# Patient Record
Sex: MALE | Race: OTHER
[De-identification: names, ages, dates, MRNs, and addresses within clinical notes are randomized per-mention and may not be internally consistent; named-entity substitution may affect disease eponyms.]

---

## 2021-08-02 ENCOUNTER — HOSPITAL ENCOUNTER (INPATIENT)
Dept: HOSPITAL 74 - YASAS | Age: 46
LOS: 28 days | Discharge: HOME | DRG: 772 | End: 2021-08-30
Attending: ALLERGY & IMMUNOLOGY | Admitting: ALLERGY & IMMUNOLOGY
Payer: COMMERCIAL

## 2021-08-02 VITALS — BODY MASS INDEX: 29.2 KG/M2

## 2021-08-02 DIAGNOSIS — Z56.0: ICD-10-CM

## 2021-08-02 DIAGNOSIS — F14.20: ICD-10-CM

## 2021-08-02 DIAGNOSIS — Z86.59: ICD-10-CM

## 2021-08-02 DIAGNOSIS — F12.20: ICD-10-CM

## 2021-08-02 DIAGNOSIS — L84: ICD-10-CM

## 2021-08-02 DIAGNOSIS — Z90.49: ICD-10-CM

## 2021-08-02 DIAGNOSIS — F11.20: Primary | ICD-10-CM

## 2021-08-02 DIAGNOSIS — Z59.0: ICD-10-CM

## 2021-08-02 PROCEDURE — HZ42ZZZ GROUP COUNSELING FOR SUBSTANCE ABUSE TREATMENT, COGNITIVE-BEHAVIORAL: ICD-10-PCS | Performed by: ALLERGY & IMMUNOLOGY

## 2021-08-02 PROCEDURE — U0003 INFECTIOUS AGENT DETECTION BY NUCLEIC ACID (DNA OR RNA); SEVERE ACUTE RESPIRATORY SYNDROME CORONAVIRUS 2 (SARS-COV-2) (CORONAVIRUS DISEASE [COVID-19]), AMPLIFIED PROBE TECHNIQUE, MAKING USE OF HIGH THROUGHPUT TECHNOLOGIES AS DESCRIBED BY CMS-2020-01-R: HCPCS

## 2021-08-02 PROCEDURE — C9803 HOPD COVID-19 SPEC COLLECT: HCPCS

## 2021-08-02 PROCEDURE — U0005 INFEC AGEN DETEC AMPLI PROBE: HCPCS

## 2021-08-02 RX ADMIN — Medication SCH MG: at 22:50

## 2021-08-02 RX ADMIN — TRAZODONE HYDROCHLORIDE SCH MG: 50 TABLET ORAL at 22:50

## 2021-08-02 RX ADMIN — HYDROXYZINE PAMOATE PRN MG: 25 CAPSULE ORAL at 22:50

## 2021-08-02 SDOH — ECONOMIC STABILITY - INCOME SECURITY: UNEMPLOYMENT, UNSPECIFIED: Z56.0

## 2021-08-02 SDOH — ECONOMIC STABILITY - HOUSING INSECURITY: HOMELESSNESS: Z59.0

## 2021-08-03 LAB
ALBUMIN SERPL-MCNC: 3.2 G/DL (ref 3.4–5)
ALP SERPL-CCNC: 62 U/L (ref 45–117)
ALT SERPL-CCNC: 31 U/L (ref 13–61)
ANION GAP SERPL CALC-SCNC: 7 MMOL/L (ref 8–16)
AST SERPL-CCNC: 23 U/L (ref 15–37)
BILIRUB SERPL-MCNC: 0.5 MG/DL (ref 0.2–1)
BUN SERPL-MCNC: 9.9 MG/DL (ref 7–18)
CALCIUM SERPL-MCNC: 8.6 MG/DL (ref 8.5–10.1)
CHLORIDE SERPL-SCNC: 105 MMOL/L (ref 98–107)
CO2 SERPL-SCNC: 26 MMOL/L (ref 21–32)
CREAT SERPL-MCNC: 0.9 MG/DL (ref 0.55–1.3)
DEPRECATED RDW RBC AUTO: 13.5 % (ref 11.9–15.9)
GLUCOSE SERPL-MCNC: 213 MG/DL (ref 74–106)
HCT VFR BLD CALC: 35.8 % (ref 35.4–49)
HGB BLD-MCNC: 12 GM/DL (ref 11.7–16.9)
MCH RBC QN AUTO: 29.7 PG (ref 25.7–33.7)
MCHC RBC AUTO-ENTMCNC: 33.6 G/DL (ref 32–35.9)
MCV RBC: 88.3 FL (ref 80–96)
PLATELET # BLD AUTO: 256 10^3/UL (ref 134–434)
PMV BLD: 8.8 FL (ref 7.5–11.1)
PROT SERPL-MCNC: 7.1 G/DL (ref 6.4–8.2)
RBC # BLD AUTO: 4.05 M/MM3 (ref 4–5.6)
SODIUM SERPL-SCNC: 138 MMOL/L (ref 136–145)
WBC # BLD AUTO: 6 K/MM3 (ref 4–10)

## 2021-08-03 RX ADMIN — METFORMIN HYDROCHLORIDE SCH MG: 500 TABLET ORAL at 10:05

## 2021-08-03 RX ADMIN — TRAZODONE HYDROCHLORIDE SCH MG: 50 TABLET ORAL at 21:47

## 2021-08-03 RX ADMIN — Medication SCH MG: at 21:47

## 2021-08-03 RX ADMIN — METFORMIN HYDROCHLORIDE SCH MG: 500 TABLET ORAL at 17:11

## 2021-08-03 RX ADMIN — Medication SCH TAB: at 10:05

## 2021-08-04 RX ADMIN — Medication SCH MG: at 21:28

## 2021-08-04 RX ADMIN — TRAZODONE HYDROCHLORIDE SCH MG: 100 TABLET ORAL at 21:28

## 2021-08-04 RX ADMIN — Medication SCH TAB: at 10:11

## 2021-08-04 RX ADMIN — METFORMIN HYDROCHLORIDE SCH MG: 500 TABLET ORAL at 06:57

## 2021-08-04 RX ADMIN — METFORMIN HYDROCHLORIDE SCH MG: 500 TABLET ORAL at 17:15

## 2021-08-05 LAB
APPEARANCE UR: CLEAR
BILIRUB UR STRIP.AUTO-MCNC: NEGATIVE MG/DL
COLOR UR: YELLOW
KETONES UR QL STRIP: NEGATIVE
LEUKOCYTE ESTERASE UR QL STRIP.AUTO: NEGATIVE
NITRITE UR QL STRIP: NEGATIVE
PH UR: 5 [PH] (ref 5–8)
PROT UR QL STRIP: NEGATIVE
PROT UR QL STRIP: NEGATIVE
SP GR UR: 1.02 (ref 1.01–1.03)
UROBILINOGEN UR STRIP-MCNC: 0.2 MG/DL (ref 0.2–1)

## 2021-08-05 RX ADMIN — TRAZODONE HYDROCHLORIDE SCH MG: 100 TABLET ORAL at 21:45

## 2021-08-05 RX ADMIN — METFORMIN HYDROCHLORIDE SCH MG: 500 TABLET ORAL at 16:38

## 2021-08-05 RX ADMIN — Medication SCH MG: at 21:45

## 2021-08-05 RX ADMIN — Medication SCH TAB: at 09:47

## 2021-08-05 RX ADMIN — METFORMIN HYDROCHLORIDE SCH MG: 500 TABLET ORAL at 06:29

## 2021-08-05 RX ADMIN — ALUMINUM HYDROXIDE, MAGNESIUM HYDROXIDE, AND SIMETHICONE PRN ML: 200; 200; 20 SUSPENSION ORAL at 09:09

## 2021-08-06 RX ADMIN — Medication SCH MG: at 21:29

## 2021-08-06 RX ADMIN — Medication SCH TAB: at 09:52

## 2021-08-06 RX ADMIN — METFORMIN HYDROCHLORIDE SCH MG: 500 TABLET ORAL at 06:26

## 2021-08-06 RX ADMIN — TRAZODONE HYDROCHLORIDE SCH MG: 100 TABLET ORAL at 21:28

## 2021-08-06 RX ADMIN — METFORMIN HYDROCHLORIDE SCH MG: 500 TABLET ORAL at 16:45

## 2021-08-07 RX ADMIN — METFORMIN HYDROCHLORIDE SCH MG: 500 TABLET ORAL at 17:11

## 2021-08-07 RX ADMIN — Medication SCH MG: at 22:33

## 2021-08-07 RX ADMIN — Medication SCH TAB: at 09:46

## 2021-08-07 RX ADMIN — Medication SCH MG: at 22:32

## 2021-08-07 RX ADMIN — METFORMIN HYDROCHLORIDE SCH MG: 500 TABLET ORAL at 06:48

## 2021-08-07 RX ADMIN — TRAZODONE HYDROCHLORIDE SCH MG: 100 TABLET ORAL at 22:32

## 2021-08-08 RX ADMIN — TRAZODONE HYDROCHLORIDE SCH MG: 100 TABLET ORAL at 21:56

## 2021-08-08 RX ADMIN — METFORMIN HYDROCHLORIDE SCH MG: 500 TABLET ORAL at 16:26

## 2021-08-08 RX ADMIN — Medication SCH MG: at 21:56

## 2021-08-08 RX ADMIN — METFORMIN HYDROCHLORIDE SCH MG: 500 TABLET ORAL at 06:22

## 2021-08-08 RX ADMIN — Medication SCH TAB: at 09:13

## 2021-08-09 RX ADMIN — Medication SCH: at 10:23

## 2021-08-09 RX ADMIN — Medication SCH MG: at 21:31

## 2021-08-09 RX ADMIN — TRAZODONE HYDROCHLORIDE SCH MG: 100 TABLET ORAL at 21:31

## 2021-08-09 RX ADMIN — ALUMINUM HYDROXIDE, MAGNESIUM HYDROXIDE, AND SIMETHICONE PRN ML: 200; 200; 20 SUSPENSION ORAL at 06:56

## 2021-08-09 RX ADMIN — METFORMIN HYDROCHLORIDE SCH MG: 500 TABLET ORAL at 06:57

## 2021-08-09 RX ADMIN — METFORMIN HYDROCHLORIDE SCH: 500 TABLET ORAL at 16:55

## 2021-08-10 RX ADMIN — Medication SCH MG: at 21:44

## 2021-08-10 RX ADMIN — METFORMIN HYDROCHLORIDE SCH MG: 500 TABLET ORAL at 17:36

## 2021-08-10 RX ADMIN — Medication SCH TAB: at 09:31

## 2021-08-10 RX ADMIN — TRAZODONE HYDROCHLORIDE SCH MG: 100 TABLET ORAL at 21:45

## 2021-08-10 RX ADMIN — METFORMIN HYDROCHLORIDE SCH MG: 500 TABLET ORAL at 06:25

## 2021-08-11 RX ADMIN — TRAZODONE HYDROCHLORIDE SCH MG: 100 TABLET ORAL at 21:31

## 2021-08-11 RX ADMIN — METFORMIN HYDROCHLORIDE SCH MG: 500 TABLET ORAL at 06:34

## 2021-08-11 RX ADMIN — Medication SCH TAB: at 09:48

## 2021-08-11 RX ADMIN — METFORMIN HYDROCHLORIDE SCH MG: 500 TABLET ORAL at 16:41

## 2021-08-11 RX ADMIN — Medication SCH MG: at 21:31

## 2021-08-12 RX ADMIN — METFORMIN HYDROCHLORIDE SCH MG: 500 TABLET ORAL at 06:28

## 2021-08-12 RX ADMIN — Medication SCH MG: at 21:59

## 2021-08-12 RX ADMIN — METFORMIN HYDROCHLORIDE SCH MG: 500 TABLET ORAL at 16:42

## 2021-08-12 RX ADMIN — Medication SCH TAB: at 09:59

## 2021-08-12 RX ADMIN — TRAZODONE HYDROCHLORIDE SCH MG: 100 TABLET ORAL at 21:59

## 2021-08-13 RX ADMIN — METFORMIN HYDROCHLORIDE SCH MG: 500 TABLET ORAL at 06:18

## 2021-08-13 RX ADMIN — TRAZODONE HYDROCHLORIDE SCH MG: 100 TABLET ORAL at 21:42

## 2021-08-13 RX ADMIN — Medication SCH MG: at 21:42

## 2021-08-13 RX ADMIN — Medication SCH TAB: at 10:04

## 2021-08-13 RX ADMIN — METFORMIN HYDROCHLORIDE SCH MG: 500 TABLET ORAL at 17:42

## 2021-08-14 RX ADMIN — TRAZODONE HYDROCHLORIDE SCH MG: 100 TABLET ORAL at 21:36

## 2021-08-14 RX ADMIN — Medication SCH MG: at 21:36

## 2021-08-14 RX ADMIN — METFORMIN HYDROCHLORIDE SCH MG: 500 TABLET ORAL at 06:52

## 2021-08-14 RX ADMIN — Medication SCH TAB: at 10:02

## 2021-08-14 RX ADMIN — METFORMIN HYDROCHLORIDE SCH MG: 500 TABLET ORAL at 17:43

## 2021-08-15 RX ADMIN — TRAZODONE HYDROCHLORIDE SCH MG: 100 TABLET ORAL at 21:19

## 2021-08-15 RX ADMIN — Medication SCH MG: at 21:19

## 2021-08-15 RX ADMIN — Medication SCH TAB: at 09:52

## 2021-08-15 RX ADMIN — METFORMIN HYDROCHLORIDE SCH: 500 TABLET ORAL at 16:33

## 2021-08-15 RX ADMIN — HYDROXYZINE PAMOATE PRN MG: 25 CAPSULE ORAL at 21:19

## 2021-08-15 RX ADMIN — METFORMIN HYDROCHLORIDE SCH MG: 500 TABLET ORAL at 06:35

## 2021-08-16 RX ADMIN — METFORMIN HYDROCHLORIDE SCH MG: 500 TABLET ORAL at 06:27

## 2021-08-16 RX ADMIN — Medication SCH TAB: at 09:53

## 2021-08-16 RX ADMIN — METFORMIN HYDROCHLORIDE SCH MG: 500 TABLET ORAL at 16:41

## 2021-08-16 RX ADMIN — Medication SCH MG: at 21:31

## 2021-08-16 RX ADMIN — TRAZODONE HYDROCHLORIDE SCH MG: 100 TABLET ORAL at 21:31

## 2021-08-17 RX ADMIN — Medication SCH MG: at 21:04

## 2021-08-17 RX ADMIN — METFORMIN HYDROCHLORIDE SCH MG: 500 TABLET ORAL at 16:47

## 2021-08-17 RX ADMIN — Medication SCH MG: at 21:03

## 2021-08-17 RX ADMIN — TRAZODONE HYDROCHLORIDE SCH MG: 100 TABLET ORAL at 21:04

## 2021-08-17 RX ADMIN — Medication SCH TAB: at 09:30

## 2021-08-17 RX ADMIN — METFORMIN HYDROCHLORIDE SCH MG: 500 TABLET ORAL at 06:16

## 2021-08-18 RX ADMIN — Medication SCH MG: at 21:40

## 2021-08-18 RX ADMIN — TRAZODONE HYDROCHLORIDE SCH MG: 100 TABLET ORAL at 21:40

## 2021-08-18 RX ADMIN — METFORMIN HYDROCHLORIDE SCH MG: 500 TABLET ORAL at 06:28

## 2021-08-18 RX ADMIN — Medication SCH TAB: at 09:47

## 2021-08-18 RX ADMIN — METFORMIN HYDROCHLORIDE SCH MG: 500 TABLET ORAL at 16:55

## 2021-08-19 RX ADMIN — Medication SCH MG: at 21:35

## 2021-08-19 RX ADMIN — Medication SCH TAB: at 09:42

## 2021-08-19 RX ADMIN — TRAZODONE HYDROCHLORIDE SCH MG: 100 TABLET ORAL at 21:35

## 2021-08-19 RX ADMIN — METFORMIN HYDROCHLORIDE SCH MG: 500 TABLET ORAL at 16:50

## 2021-08-19 RX ADMIN — METFORMIN HYDROCHLORIDE SCH MG: 500 TABLET ORAL at 07:16

## 2021-08-20 RX ADMIN — METFORMIN HYDROCHLORIDE SCH MG: 500 TABLET ORAL at 17:18

## 2021-08-20 RX ADMIN — Medication SCH MG: at 21:36

## 2021-08-20 RX ADMIN — Medication SCH TAB: at 09:56

## 2021-08-20 RX ADMIN — METFORMIN HYDROCHLORIDE SCH MG: 500 TABLET ORAL at 06:20

## 2021-08-20 RX ADMIN — TRAZODONE HYDROCHLORIDE SCH MG: 100 TABLET ORAL at 21:36

## 2021-08-21 RX ADMIN — TRAZODONE HYDROCHLORIDE SCH MG: 100 TABLET ORAL at 21:30

## 2021-08-21 RX ADMIN — METFORMIN HYDROCHLORIDE SCH MG: 500 TABLET ORAL at 06:36

## 2021-08-21 RX ADMIN — Medication SCH TAB: at 09:15

## 2021-08-21 RX ADMIN — Medication SCH MG: at 21:31

## 2021-08-21 RX ADMIN — METFORMIN HYDROCHLORIDE SCH MG: 500 TABLET ORAL at 17:07

## 2021-08-21 RX ADMIN — Medication SCH MG: at 21:30

## 2021-08-22 RX ADMIN — METFORMIN HYDROCHLORIDE SCH MG: 500 TABLET ORAL at 16:43

## 2021-08-22 RX ADMIN — TRAZODONE HYDROCHLORIDE SCH MG: 100 TABLET ORAL at 21:30

## 2021-08-22 RX ADMIN — Medication SCH MG: at 21:30

## 2021-08-22 RX ADMIN — METFORMIN HYDROCHLORIDE SCH MG: 500 TABLET ORAL at 06:25

## 2021-08-22 RX ADMIN — Medication SCH TAB: at 09:06

## 2021-08-23 RX ADMIN — Medication SCH MG: at 21:44

## 2021-08-23 RX ADMIN — TRAZODONE HYDROCHLORIDE SCH MG: 100 TABLET ORAL at 21:44

## 2021-08-23 RX ADMIN — METFORMIN HYDROCHLORIDE SCH MG: 500 TABLET ORAL at 06:23

## 2021-08-23 RX ADMIN — Medication SCH TAB: at 09:55

## 2021-08-23 RX ADMIN — METFORMIN HYDROCHLORIDE SCH MG: 500 TABLET ORAL at 17:15

## 2021-08-24 RX ADMIN — TRAZODONE HYDROCHLORIDE SCH MG: 100 TABLET ORAL at 21:43

## 2021-08-24 RX ADMIN — Medication SCH MG: at 21:43

## 2021-08-24 RX ADMIN — Medication SCH TAB: at 09:27

## 2021-08-24 RX ADMIN — METFORMIN HYDROCHLORIDE SCH MG: 500 TABLET ORAL at 18:53

## 2021-08-24 RX ADMIN — METFORMIN HYDROCHLORIDE SCH MG: 500 TABLET ORAL at 06:32

## 2021-08-25 RX ADMIN — TRAZODONE HYDROCHLORIDE SCH MG: 100 TABLET ORAL at 21:41

## 2021-08-25 RX ADMIN — Medication SCH TAB: at 09:19

## 2021-08-25 RX ADMIN — METFORMIN HYDROCHLORIDE SCH MG: 500 TABLET ORAL at 16:56

## 2021-08-25 RX ADMIN — Medication SCH MG: at 21:41

## 2021-08-25 RX ADMIN — METFORMIN HYDROCHLORIDE SCH MG: 500 TABLET ORAL at 06:39

## 2021-08-26 RX ADMIN — Medication SCH MG: at 21:30

## 2021-08-26 RX ADMIN — Medication SCH TAB: at 09:32

## 2021-08-26 RX ADMIN — TRAZODONE HYDROCHLORIDE SCH MG: 100 TABLET ORAL at 21:30

## 2021-08-26 RX ADMIN — METFORMIN HYDROCHLORIDE SCH MG: 500 TABLET ORAL at 16:48

## 2021-08-26 RX ADMIN — METFORMIN HYDROCHLORIDE SCH MG: 500 TABLET ORAL at 06:14

## 2021-08-27 RX ADMIN — Medication SCH MG: at 22:05

## 2021-08-27 RX ADMIN — TRAZODONE HYDROCHLORIDE SCH MG: 100 TABLET ORAL at 22:05

## 2021-08-27 RX ADMIN — METFORMIN HYDROCHLORIDE SCH MG: 500 TABLET ORAL at 06:40

## 2021-08-27 RX ADMIN — METFORMIN HYDROCHLORIDE SCH MG: 500 TABLET ORAL at 17:06

## 2021-08-27 RX ADMIN — Medication SCH TAB: at 10:02

## 2021-08-28 RX ADMIN — Medication SCH MG: at 21:12

## 2021-08-28 RX ADMIN — METFORMIN HYDROCHLORIDE SCH MG: 500 TABLET ORAL at 16:10

## 2021-08-28 RX ADMIN — METFORMIN HYDROCHLORIDE SCH MG: 500 TABLET ORAL at 06:42

## 2021-08-28 RX ADMIN — Medication SCH TAB: at 09:53

## 2021-08-28 RX ADMIN — TRAZODONE HYDROCHLORIDE SCH MG: 100 TABLET ORAL at 21:12

## 2021-08-29 RX ADMIN — Medication SCH MG: at 22:13

## 2021-08-29 RX ADMIN — METFORMIN HYDROCHLORIDE SCH MG: 500 TABLET ORAL at 06:27

## 2021-08-29 RX ADMIN — METFORMIN HYDROCHLORIDE SCH MG: 500 TABLET ORAL at 16:15

## 2021-08-29 RX ADMIN — Medication SCH TAB: at 09:31

## 2021-08-29 RX ADMIN — TRAZODONE HYDROCHLORIDE SCH MG: 100 TABLET ORAL at 22:13

## 2021-08-30 VITALS — HEART RATE: 101 BPM | TEMPERATURE: 97.6 F | SYSTOLIC BLOOD PRESSURE: 93 MMHG | DIASTOLIC BLOOD PRESSURE: 62 MMHG

## 2021-08-30 RX ADMIN — METFORMIN HYDROCHLORIDE SCH MG: 500 TABLET ORAL at 07:09

## 2021-08-30 RX ADMIN — Medication SCH TAB: at 09:33

## 2021-11-10 ENCOUNTER — EMERGENCY (EMERGENCY)
Facility: HOSPITAL | Age: 46
LOS: 1 days | Discharge: LEFT BEFORE DISCHARGE | End: 2021-11-10
Attending: EMERGENCY MEDICINE | Admitting: EMERGENCY MEDICINE
Payer: MEDICAID

## 2021-11-10 VITALS
WEIGHT: 160.06 LBS | SYSTOLIC BLOOD PRESSURE: 130 MMHG | OXYGEN SATURATION: 98 % | TEMPERATURE: 99 F | DIASTOLIC BLOOD PRESSURE: 92 MMHG | RESPIRATION RATE: 18 BRPM | HEART RATE: 81 BPM | HEIGHT: 65 IN

## 2021-11-10 DIAGNOSIS — F31.9 BIPOLAR DISORDER, UNSPECIFIED: ICD-10-CM

## 2021-11-10 DIAGNOSIS — Z20.822 CONTACT WITH AND (SUSPECTED) EXPOSURE TO COVID-19: ICD-10-CM

## 2021-11-10 DIAGNOSIS — F12.10 CANNABIS ABUSE, UNCOMPLICATED: ICD-10-CM
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
## 2021-11-10 DIAGNOSIS — R45.851 SUICIDAL IDEATIONS: ICD-10-CM

## 2021-11-10 DIAGNOSIS — F17.200 NICOTINE DEPENDENCE, UNSPECIFIED, UNCOMPLICATED: ICD-10-CM

## 2021-11-10 DIAGNOSIS — F14.10 COCAINE ABUSE, UNCOMPLICATED: ICD-10-CM

## 2021-11-10 DIAGNOSIS — F10.10 ALCOHOL ABUSE, UNCOMPLICATED: ICD-10-CM

## 2021-11-10 LAB
ALBUMIN SERPL ELPH-MCNC: 4.1 G/DL — SIGNIFICANT CHANGE UP (ref 3.4–5)
ALP SERPL-CCNC: 74 U/L — SIGNIFICANT CHANGE UP (ref 40–120)
ALT FLD-CCNC: 22 U/L — SIGNIFICANT CHANGE UP (ref 12–42)
AMPHET UR-MCNC: NEGATIVE — SIGNIFICANT CHANGE UP
ANION GAP SERPL CALC-SCNC: 12 MMOL/L — SIGNIFICANT CHANGE UP (ref 9–16)
APAP SERPL-MCNC: <2 UG/ML — LOW (ref 10–30)
APPEARANCE UR: CLEAR — SIGNIFICANT CHANGE UP
AST SERPL-CCNC: 26 U/L — SIGNIFICANT CHANGE UP (ref 15–37)
BACTERIA # UR AUTO: PRESENT /HPF
BARBITURATES UR SCN-MCNC: NEGATIVE — SIGNIFICANT CHANGE UP
BASOPHILS # BLD AUTO: 0.03 K/UL — SIGNIFICANT CHANGE UP (ref 0–0.2)
BASOPHILS NFR BLD AUTO: 0.3 % — SIGNIFICANT CHANGE UP (ref 0–2)
BENZODIAZ UR-MCNC: NEGATIVE — SIGNIFICANT CHANGE UP
BILIRUB SERPL-MCNC: 0.6 MG/DL — SIGNIFICANT CHANGE UP (ref 0.2–1.2)
BILIRUB UR-MCNC: ABNORMAL
BUN SERPL-MCNC: 16 MG/DL — SIGNIFICANT CHANGE UP (ref 7–23)
CALCIUM SERPL-MCNC: 9.6 MG/DL — SIGNIFICANT CHANGE UP (ref 8.5–10.5)
CHLORIDE SERPL-SCNC: 97 MMOL/L — SIGNIFICANT CHANGE UP (ref 96–108)
CO2 SERPL-SCNC: 23 MMOL/L — SIGNIFICANT CHANGE UP (ref 22–31)
COCAINE METAB.OTHER UR-MCNC: POSITIVE
COLOR SPEC: YELLOW — SIGNIFICANT CHANGE UP
CREAT SERPL-MCNC: 0.92 MG/DL — SIGNIFICANT CHANGE UP (ref 0.5–1.3)
DIFF PNL FLD: NEGATIVE — SIGNIFICANT CHANGE UP
EOSINOPHIL # BLD AUTO: 0.05 K/UL — SIGNIFICANT CHANGE UP (ref 0–0.5)
EOSINOPHIL NFR BLD AUTO: 0.5 % — SIGNIFICANT CHANGE UP (ref 0–6)
EPI CELLS # UR: SIGNIFICANT CHANGE UP /HPF (ref 0–5)
ETHANOL SERPL-MCNC: <3 MG/DL — SIGNIFICANT CHANGE UP
GLUCOSE SERPL-MCNC: 307 MG/DL — HIGH (ref 70–99)
GLUCOSE UR QL: 500
HCT VFR BLD CALC: 42.6 % — SIGNIFICANT CHANGE UP (ref 39–50)
HGB BLD-MCNC: 14.1 G/DL — SIGNIFICANT CHANGE UP (ref 13–17)
IMM GRANULOCYTES NFR BLD AUTO: 0.3 % — SIGNIFICANT CHANGE UP (ref 0–1.5)
KETONES UR-MCNC: 15 MG/DL
LEUKOCYTE ESTERASE UR-ACNC: NEGATIVE — SIGNIFICANT CHANGE UP
LYMPHOCYTES # BLD AUTO: 2.42 K/UL — SIGNIFICANT CHANGE UP (ref 1–3.3)
LYMPHOCYTES # BLD AUTO: 24.7 % — SIGNIFICANT CHANGE UP (ref 13–44)
MCHC RBC-ENTMCNC: 28.9 PG — SIGNIFICANT CHANGE UP (ref 27–34)
MCHC RBC-ENTMCNC: 33.1 GM/DL — SIGNIFICANT CHANGE UP (ref 32–36)
MCV RBC AUTO: 87.3 FL — SIGNIFICANT CHANGE UP (ref 80–100)
METHADONE UR-MCNC: NEGATIVE — SIGNIFICANT CHANGE UP
MONOCYTES # BLD AUTO: 0.82 K/UL — SIGNIFICANT CHANGE UP (ref 0–0.9)
MONOCYTES NFR BLD AUTO: 8.4 % — SIGNIFICANT CHANGE UP (ref 2–14)
NEUTROPHILS # BLD AUTO: 6.43 K/UL — SIGNIFICANT CHANGE UP (ref 1.8–7.4)
NEUTROPHILS NFR BLD AUTO: 65.8 % — SIGNIFICANT CHANGE UP (ref 43–77)
NITRITE UR-MCNC: NEGATIVE — SIGNIFICANT CHANGE UP
NRBC # BLD: 0 /100 WBCS — SIGNIFICANT CHANGE UP (ref 0–0)
OPIATES UR-MCNC: NEGATIVE — SIGNIFICANT CHANGE UP
PCP SPEC-MCNC: SIGNIFICANT CHANGE UP
PCP UR-MCNC: NEGATIVE — SIGNIFICANT CHANGE UP
PH UR: 6 — SIGNIFICANT CHANGE UP (ref 5–8)
PLATELET # BLD AUTO: 324 K/UL — SIGNIFICANT CHANGE UP (ref 150–400)
POTASSIUM SERPL-MCNC: 3.8 MMOL/L — SIGNIFICANT CHANGE UP (ref 3.5–5.3)
POTASSIUM SERPL-SCNC: 3.8 MMOL/L — SIGNIFICANT CHANGE UP (ref 3.5–5.3)
PROT SERPL-MCNC: 8.9 G/DL — HIGH (ref 6.4–8.2)
PROT UR-MCNC: 30 MG/DL
RBC # BLD: 4.88 M/UL — SIGNIFICANT CHANGE UP (ref 4.2–5.8)
RBC # FLD: 13.2 % — SIGNIFICANT CHANGE UP (ref 10.3–14.5)
RBC CASTS # UR COMP ASSIST: < 5 /HPF — SIGNIFICANT CHANGE UP
SALICYLATES SERPL-MCNC: <2.8 MG/DL — SIGNIFICANT CHANGE UP (ref 2.8–20)
SARS-COV-2 RNA SPEC QL NAA+PROBE: SIGNIFICANT CHANGE UP
SODIUM SERPL-SCNC: 132 MMOL/L — SIGNIFICANT CHANGE UP (ref 132–145)
SP GR SPEC: >=1.03 — SIGNIFICANT CHANGE UP (ref 1–1.03)
THC UR QL: POSITIVE
TSH SERPL-MCNC: 0.64 UIU/ML — SIGNIFICANT CHANGE UP (ref 0.36–3.74)
UROBILINOGEN FLD QL: 0.2 E.U./DL — SIGNIFICANT CHANGE UP
WBC # BLD: 9.78 K/UL — SIGNIFICANT CHANGE UP (ref 3.8–10.5)
WBC # FLD AUTO: 9.78 K/UL — SIGNIFICANT CHANGE UP (ref 3.8–10.5)
WBC UR QL: < 5 /HPF — SIGNIFICANT CHANGE UP

## 2021-11-10 PROCEDURE — 99285 EMERGENCY DEPT VISIT HI MDM: CPT

## 2021-11-10 PROCEDURE — 90792 PSYCH DIAG EVAL W/MED SRVCS: CPT | Mod: 95

## 2021-11-10 RX ORDER — TRAZODONE HCL 50 MG
50 TABLET ORAL ONCE
Refills: 0 | Status: COMPLETED | OUTPATIENT
Start: 2021-11-10 | End: 2021-11-10

## 2021-11-10 RX ORDER — METFORMIN HYDROCHLORIDE 850 MG/1
1000 TABLET ORAL ONCE
Refills: 0 | Status: COMPLETED | OUTPATIENT
Start: 2021-11-10 | End: 2021-11-10

## 2021-11-10 RX ADMIN — METFORMIN HYDROCHLORIDE 1000 MILLIGRAM(S): 850 TABLET ORAL at 22:17

## 2021-11-10 RX ADMIN — Medication 50 MILLIGRAM(S): at 22:17
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
## 2021-11-10 NOTE — ED BEHAVIORAL HEALTH ASSESSMENT NOTE - VIOLENCE RISK FACTORS:
Substance abuse/Affective dysregulation/Impulsivity/Command hallucinations for violent behavior/Noncompliance with treatment

## 2021-11-10 NOTE — ED PROVIDER NOTE - PHYSICAL EXAMINATION
VITAL SIGNS: I have reviewed nursing notes and confirm.  CONSTITUTIONAL: Well-developed; well-nourished; in no acute distress.  SKIN: Skin exam is warm and dry, no acute rash.  HEAD: Normocephalic; atraumatic.  EYES: PERRL, EOM intact; conjunctiva and sclera clear.  ENT: No nasal discharge; airway clear.  NECK: Supple   CARD:   Regular rate and rhythm.  RESP: Unlabored.   ABD: soft; non-distended; non-tender  EXT: Normal ROM.    NEURO: Alert, oriented. Grossly unremarkable.  PSYCH: Cooperative, + dysphoric

## 2021-11-10 NOTE — ED ADULT NURSE NOTE - OBJECTIVE STATEMENT
PT came in c/o of SI/HI. Pt reports plan to jump in front of a car today. Reports having HI "for years" no plan. Reports auditory hallucinations. Reports cocaine/etoh use today. PMH of bipolar compliant with medications. Denies fever, chills, sob, cp, n/v/d. A&Ox3 speaking in full sentences. 1:1 constant observation initiated at triage. Ambulatory with steady gait

## 2021-11-10 NOTE — ED BEHAVIORAL HEALTH ASSESSMENT NOTE - NSSUICRSKFACTOR_PSY_ALL_CORE
Current and Past Psychiatric Diagnoses Current and Past Psychiatric Diagnoses/Presenting Symptoms/Treatment Related Factors

## 2021-11-10 NOTE — ED BEHAVIORAL HEALTH ASSESSMENT NOTE - CURRENT MEDICATION
Prozac dose unknown; buspar dose unknown; trazodone 100 mg hs;  suboxone (reports prescribed by PCP, doesn't know dose; not on Istop  Metformin 500 mg bid
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
## 2021-11-10 NOTE — ED BEHAVIORAL HEALTH ASSESSMENT NOTE - SUICIDAL BEHAVIOR DETAILS
What Type Of Note Output Would You Prefer (Optional)?: Standard Output Hpi Title: Evaluation of Skin Lesions How Severe Are Your Spot(S)?: mild Have Your Spot(S) Been Treated In The Past?: has not been treated si

## 2021-11-10 NOTE — ED ADULT TRIAGE NOTE - CHIEF COMPLAINT QUOTE
PT complaining of SI/HI. Pt states that he plans to jump in front of a car. PT with no plan for HI. Pt with + auditory hallucinations. Pt with history of bipolar. Pt admits to drinking alcohol and using cocaine today. Pt placed on one to one.

## 2021-11-10 NOTE — ED ADULT NURSE NOTE - NS_BH TRG QUESTION7_ED_ALL_ED
No significant past surgical history    S/P tubal ligation     Depression (without Suicidality or Psychosis)/Estefany (includes Bipolar Disorder)
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
## 2021-11-10 NOTE — ED BEHAVIORAL HEALTH ASSESSMENT NOTE - DESCRIPTION
calm and cooperative; no prn's required. u/tox positive for cocaine and THC  Vital Signs Last 24 Hrs  T(C): 36.9 (10 Nov 2021 21:04), Max: 37.1 (10 Nov 2021 14:27)  T(F): 98.4 (10 Nov 2021 21:04), Max: 98.7 (10 Nov 2021 14:27)  HR: 77 (10 Nov 2021 21:04) (77 - 81)  BP: 115/76 (10 Nov 2021 21:04) (115/76 - 130/92)  BP(mean): --  RR: 16 (10 Nov 2021 21:04) (16 - 18)  SpO2: 99% (10 Nov 2021 21:04) (97% - 99%) DMII 46 year old SH[employer], domiciled with a friend, unemployed and receiving entitlements

## 2021-11-10 NOTE — ED BEHAVIORAL HEALTH ASSESSMENT NOTE - OTHER PAST PSYCHIATRIC HISTORY (INCLUDE DETAILS REGARDING ONSET, COURSE OF ILLNESS, INPATIENT/OUTPATIENT TREATMENT)
In current treatment with Dr. Childers  Mx prior px admissions, >10, last [facility]  2 prior sx attempts, 2015

## 2021-11-10 NOTE — ED BEHAVIORAL HEALTH ASSESSMENT NOTE - SUBSTANCE ISSUES AND PLAN (INCLUDE STANDING AND PRN MEDICATION)
GABINO's for ETOH withdawal:  Reports being on suboxone, but does not know dose and does not come up on istop GABINO's for ETOH withdrawal:  Reports being on suboxone, but does not know dose and does not come up on istop

## 2021-11-10 NOTE — ED BEHAVIORAL HEALTH ASSESSMENT NOTE - CASE SUMMARY
45 yo [place]ots[place]an-speaking male, with hx of mood disorder, active substance use, presented with thoughts of harming self and others, seeking and appropriate for voluntary admission.    COVID Exposure Screen- Patient  1.	*Have you had a COVID-19 test in the last 90 days?  ( x ) Yes, today - negative   (  ) No   (  ) Unknown- Reason: _____  2.	*Have you tested positive for COVID-19 antibodies? ( x ) Yes, pt reports yes   (  ) No   (  ) Unknown- Reason: _____  3.	*Have you received 2 doses of the COVID-19 vaccine? (  ) Yes   ( x ) No, received 1 dose   (  ) Unknown- Reason: _____  4.	*In the past 10 days, have you been around anyone with a positive COVID-19 test?* (  ) Yes   ( x ) No   (  ) Unknown- Reason: ____  5.	*Have you been out of [place] within the past 10 days?* (  ) Yes   ( x ) No   (  ) Unknown- Reason: _____    Covid Screen - Collateral  none available

## 2021-11-10 NOTE — ED PROVIDER NOTE - OBJECTIVE STATEMENT
47 y/o M w/hx Bipolar Dz previously on prozac, buspar, off of meds for about 3 wks, frequently using cocaine and alcohol, last used last night, p/w AH (antagonizing voices) and worsening feelings of depression with SI. No verbalized plan. Denies active intoxication.
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
## 2021-11-11 VITALS
SYSTOLIC BLOOD PRESSURE: 120 MMHG | TEMPERATURE: 97 F | DIASTOLIC BLOOD PRESSURE: 86 MMHG | HEART RATE: 81 BPM | RESPIRATION RATE: 18 BRPM | OXYGEN SATURATION: 98 %

## 2021-11-11 NOTE — ED ADULT NURSE REASSESSMENT NOTE - NS ED NURSE REASSESS COMMENT FT1
Pt is for voluntary admission likely to [facility] as per telepsych. Awaiting accepting md and available bed for admission. Pt is in no distress, sleeping comfortably in stretcher on 1:1. Safety maintained.
Report given to receiving RN at [facility]. Transport center contacted and confirmed the set up for transfer for pt from [facility] to [facility] will be within the hour.
Received pt from perjose a RN. Pt calm and cooperative w/ staff at this time, denies pain or any other complaints. Pt admits to suicidal ideation, alcohol and drug use. Pt denies HI, auditory or visual hallucinations. Constant observation w/ 1:1 staff member and safety maintained.
